# Patient Record
Sex: MALE | Race: BLACK OR AFRICAN AMERICAN | NOT HISPANIC OR LATINO | Employment: OTHER | URBAN - METROPOLITAN AREA
[De-identification: names, ages, dates, MRNs, and addresses within clinical notes are randomized per-mention and may not be internally consistent; named-entity substitution may affect disease eponyms.]

---

## 2019-03-25 ENCOUNTER — OFFICE VISIT (OUTPATIENT)
Dept: URGENT CARE | Facility: CLINIC | Age: 64
End: 2019-03-25
Payer: COMMERCIAL

## 2019-03-25 VITALS
DIASTOLIC BLOOD PRESSURE: 69 MMHG | TEMPERATURE: 99 F | BODY MASS INDEX: 29.29 KG/M2 | SYSTOLIC BLOOD PRESSURE: 133 MMHG | WEIGHT: 176 LBS | HEART RATE: 65 BPM | OXYGEN SATURATION: 97 %

## 2019-03-25 DIAGNOSIS — E86.0 MILD DEHYDRATION: ICD-10-CM

## 2019-03-25 DIAGNOSIS — M62.838 MUSCLE SPASM: ICD-10-CM

## 2019-03-25 DIAGNOSIS — B34.9 VIRAL SYNDROME: Primary | ICD-10-CM

## 2019-03-25 PROCEDURE — 3008F BODY MASS INDEX DOCD: CPT | Mod: CPTII,S$GLB,, | Performed by: PHYSICIAN ASSISTANT

## 2019-03-25 PROCEDURE — 99214 PR OFFICE/OUTPT VISIT, EST, LEVL IV, 30-39 MIN: ICD-10-PCS | Mod: S$GLB,,, | Performed by: PHYSICIAN ASSISTANT

## 2019-03-25 PROCEDURE — 99214 OFFICE O/P EST MOD 30 MIN: CPT | Mod: S$GLB,,, | Performed by: PHYSICIAN ASSISTANT

## 2019-03-25 PROCEDURE — 3008F PR BODY MASS INDEX (BMI) DOCUMENTED: ICD-10-PCS | Mod: CPTII,S$GLB,, | Performed by: PHYSICIAN ASSISTANT

## 2019-03-25 RX ORDER — ORPHENADRINE CITRATE 100 MG/1
100 TABLET, EXTENDED RELEASE ORAL 2 TIMES DAILY
Qty: 30 TABLET | Refills: 0 | Status: SHIPPED | OUTPATIENT
Start: 2019-03-25 | End: 2019-04-09

## 2019-03-25 RX ORDER — ONDANSETRON 8 MG/1
8 TABLET, ORALLY DISINTEGRATING ORAL EVERY 6 HOURS PRN
Qty: 20 TABLET | Refills: 0 | Status: SHIPPED | OUTPATIENT
Start: 2019-03-25 | End: 2019-03-30

## 2019-03-25 NOTE — PATIENT INSTRUCTIONS
· Follow up with your primary care in 2-5 days if symptoms have not improved, or you may return here.  · If you were referred to a specialist, please follow up with that specialty.  · If you were prescribed antibiotics, please take them to completion.  · If you were prescribed a narcotic or any medication with sedative effects, do not drive or operate heavy equipment or machinery while taking these medications.  · You must understand that you have received treatment at an Urgent Care facility only, and that you may be released before all of your medical problems are known or treated. Urgent Care facilities are not equipped to handle life threatening emergencies. It is recommended that you go to an Emergency Department for further evaluation of worsening or concerning symptoms, or possibly life threatening conditions as discussed.                                        If you  smoke, please stop smoking            Dehydration (Adult)  Dehydration occurs when your body loses too much fluid. This may be the result of prolonged vomiting or diarrhea, excessive sweating, or a high fever. It may also happen if you dont drink enough fluid when youre sick or out in the heat. Misuse of diuretics (water pills) can also be a cause.  Symptoms include thirst and decreased urine output. You may also feel dizzy, weak, fatigued, or very drowsy. The diet described below is usually enough to treat dehydration. In some cases, you may need medicine.  Home care  · Drink at least 12 8-ounce glasses of fluid every day to resolve the dehydration. Fluid may include water; orange juice; lemonade; apple, grape, or cranberry juice; clear fruit drinks; electrolyte replacement and sports drinks; and teas and coffee without caffeine. If you have been diagnosed with a kidney disease, ask your doctor how much and what types of fluids you should drink to prevent dehydration. If you have kidney disease, fluid can build up in the body. This can be  dangerous to your health.  · If you have a fever, muscle aches, or a headache as a result of a cold or flu, you may take acetaminophen or ibuprofen, unless another medicine was prescribed. If you have chronic liver or kidney disease, or have ever had a stomach ulcer or gastrointestinal bleeding, talk with your health care provider before using these medicines. Don't take aspirin if you are younger than 18 and have a fever. Aspirin raises the chance for severe liver injury.  Follow-up care  Follow up with your health care provider, or as advised.  When to seek medical advice  Call your health care provider right away if any of these occur:  · Continued vomiting  · Frequent diarrhea (more than 5 times a day); blood (red or black color) or mucus in diarrhea  · Blood in vomit or stool  · Swollen abdomen or increasing abdominal pain  · Weakness, dizziness, or fainting  · Unusual drowsiness or confusion  · Reduced urine output or extreme thirst  · Fever of 100.4°F (34°C) or higher  Date Last Reviewed: 5/31/2015  © 8087-5994 [employer]. [address] [place] [address], [place] [address]. All rights reserved. This information is not intended as a substitute for professional medical care. Always follow your healthcare professional's instructions.

## 2019-03-25 NOTE — PROGRESS NOTES
Subjective:       Patient ID: Ritesh Leonard is a 63 y.o. male.    Vitals:  weight is 79.8 kg (176 lb). His oral temperature is 98.6 °F (37 °C). His blood pressure is 133/69 and his pulse is 65. His oxygen saturation is 97%.     Chief Complaint: Leg Pain    Pt describes leg cramping, primarily R, at rest with nausea, fatigue, and chills. He reports decreased appetite and fluid intake in the previous 48 hours.    Leg Pain    The incident occurred 12 to 24 hours ago. The incident occurred at home. There was no injury mechanism. The pain is present in the right thigh. The quality of the pain is described as cramping. The pain is at a severity of 8/10. The pain is severe. The pain has been intermittent since onset. Pertinent negatives include no inability to bear weight, loss of sensation, numbness or tingling. He reports no foreign bodies present. 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 Treatments tried: potassium pills. The treatment provided no relief.       Constitution: Positive for chills and fatigue. Negative for fever.   HENT: Negative for congestion and sore throat.    Neck: Negative for painful lymph nodes.   Cardiovascular: Negative for chest pain, leg swelling, palpitations, sob on exertion and passing out.   Eyes: Negative for double vision and blurred vision.   Respiratory: Negative for cough and shortness of breath.    Gastrointestinal: Positive for nausea and vomiting. Negative for diarrhea.   Genitourinary: Negative for dysuria, frequency and urgency.   Musculoskeletal: Positive for pain. Negative for trauma, joint pain, joint swelling, back pain, pain with walking, muscle cramps and muscle ache.   Skin: Negative for color change, pale and rash.   Allergic/Immunologic: Negative for seasonal allergies.   Neurological: Negative for dizziness, history of vertigo, light-headedness, passing out, coordination disturbances, loss of balance, headaches and  numbness.   Hematologic/Lymphatic: Negative for swollen lymph nodes, easy bruising/bleeding and history of blood clots. Does not bruise/bleed easily.   Psychiatric/Behavioral: Negative for nervous/anxious, sleep disturbance and depression. The patient is not nervous/anxious.        Objective:      Physical Exam   Constitutional: He is oriented to person, place, and time. Vital signs are normal. He appears well-developed and well-nourished. He is cooperative.  Non-toxic appearance. He does not appear ill. No distress.   HENT:   Head: Normocephalic and atraumatic.   Right Ear: Hearing, tympanic membrane, external ear and ear canal normal.   Left Ear: Hearing, tympanic membrane, external ear and ear canal normal.   Nose: Nose normal. No mucosal edema, rhinorrhea or nasal deformity. No epistaxis. Right sinus exhibits no maxillary sinus tenderness and no frontal sinus tenderness. Left sinus exhibits no maxillary sinus tenderness and no frontal sinus tenderness.   Mouth/Throat: Uvula is midline, oropharynx is clear and moist and mucous membranes are normal. No trismus in the jaw. Normal dentition. No uvula swelling. No posterior oropharyngeal erythema.   Eyes: Conjunctivae and lids are normal. Right eye exhibits no discharge. Left eye exhibits no discharge. No scleral icterus.   Sclera clear bilat   Neck: Trachea normal, normal range of motion, full passive range of motion without pain and phonation normal. Neck supple.   Cardiovascular: Normal rate, regular rhythm, normal heart sounds, intact distal pulses and normal pulses.   Pulmonary/Chest: Effort normal and breath sounds normal. No respiratory distress.   Abdominal: Soft. Normal appearance and bowel sounds are normal. He exhibits no distension, no abdominal bruit, no pulsatile midline mass and no mass. There is no tenderness. There is no rigidity and no guarding.   Musculoskeletal: Normal range of motion. He exhibits no edema or deformity.   Neurological: He is alert  and oriented to person, place, and time. He has normal strength. He exhibits normal muscle tone. Coordination normal.   Skin: Skin is warm, dry and intact. Capillary refill takes less than 2 seconds. He is not diaphoretic. No pallor.   No skin tenting   Psychiatric: He has a normal mood and affect. His speech is normal and behavior is normal. Judgment and thought content normal. Cognition and memory are normal.   Nursing note and vitals reviewed.      Assessment:       1. Viral syndrome    2. Muscle spasm    3. Mild dehydration        Plan:         Viral syndrome  -     ondansetron (ZOFRAN-ODT) 8 MG TbDL; Take 1 tablet (8 mg total) by mouth every 6 (six) hours as needed.  Dispense: 20 tablet; Refill: 0    Muscle spasm  -     orphenadrine (NORFLEX) 100 mg tablet; Take 1 tablet (100 mg total) by mouth 2 (two) times daily. for 15 days  Dispense: 30 tablet; Refill: 0    Mild dehydration      Patient Instructions   · Follow up with your primary care in 2-5 days if symptoms have not improved, or you may return here.  · If you were referred to a specialist, please follow up with that specialty.  · If you were prescribed antibiotics, please take them to completion.  · If you were prescribed a narcotic or any medication with sedative effects, do not drive or operate heavy equipment or machinery while taking these medications.  · You must understand that you have received treatment at an Urgent Care facility only, and that you may be released before all of your medical problems are known or treated. Urgent Care facilities are not equipped to handle life threatening emergencies. It is recommended that you go to an Emergency Department for further evaluation of worsening or concerning symptoms, or possibly life threatening conditions as discussed.                                        If you  smoke, please stop smoking            Dehydration (Adult)  Dehydration occurs when your body loses too much fluid. This may be the  result of prolonged vomiting or diarrhea, excessive sweating, or a high fever. It may also happen if you dont drink enough fluid when youre sick or out in the heat. Misuse of diuretics (water pills) can also be a cause.  Symptoms include thirst and decreased urine output. You may also feel dizzy, weak, fatigued, or very drowsy. The diet described below is usually enough to treat dehydration. In some cases, you may need medicine.  Home care  · Drink at least 12 8-ounce glasses of fluid every day to resolve the dehydration. Fluid may include water; orange juice; lemonade; apple, grape, or cranberry juice; clear fruit drinks; electrolyte replacement and sports drinks; and teas and coffee without caffeine. If you have been diagnosed with a kidney disease, ask your doctor how much and what types of fluids you should drink to prevent dehydration. If you have kidney disease, fluid can build up in the body. This can be dangerous to your health.  · If you have a fever, muscle aches, or a headache as a result of a cold or flu, you may take acetaminophen or ibuprofen, unless another medicine was prescribed. If you have chronic liver or kidney disease, or have ever had a stomach ulcer or gastrointestinal bleeding, talk with your health care provider before using these medicines. Don't take aspirin if you are younger than 18 and have a fever. Aspirin raises the chance for severe liver injury.  Follow-up care  Follow up with your health care provider, or as advised.  When to seek medical advice  Call your health care provider right away if any of these occur:  · Continued vomiting  · Frequent diarrhea (more than 5 times a day); blood (red or black color) or mucus in diarrhea  · Blood in vomit or stool  · Swollen abdomen or increasing abdominal pain  · Weakness, dizziness, or fainting  · Unusual drowsiness or confusion  · Reduced urine output or extreme thirst  · Fever of 100.4°F (34°C) or higher  Date Last Reviewed:  5/31/2015  © 4612-8465 [employer]. [address] [place] [address], [place] [address]. All rights reserved. This information is not intended as a substitute for professional medical care. Always follow your healthcare professional's instructions.

## 2019-03-25 NOTE — LETTER
March 25, 2019      [facility] [place]  [address], [address]  [place] [address]  Phone: [phone]  Fax: [phone]       Patient: Ritesh Leonard   YOB: 1955  Date of Visit: 03/25/2019    To Whom It May Concern:    Neo Leonard  was at [facility] on 03/25/2019. He may return to work/school on 3/27/2019 with no restrictions. If you have any questions or concerns, or if I can be of further assistance, please do not hesitate to contact me.    Sincerely,    Min Melgar PA-C

## 2020-02-14 ENCOUNTER — OFFICE VISIT (OUTPATIENT)
Dept: URGENT CARE | Facility: CLINIC | Age: 65
End: 2020-02-14
Payer: OTHER MISCELLANEOUS

## 2020-02-14 VITALS
HEART RATE: 85 BPM | WEIGHT: 176 LBS | BODY MASS INDEX: 29.32 KG/M2 | OXYGEN SATURATION: 97 % | DIASTOLIC BLOOD PRESSURE: 73 MMHG | HEIGHT: 65 IN | RESPIRATION RATE: 20 BRPM | SYSTOLIC BLOOD PRESSURE: 136 MMHG

## 2020-02-14 DIAGNOSIS — S39.92XA INJURY OF LOW BACK, INITIAL ENCOUNTER: ICD-10-CM

## 2020-02-14 DIAGNOSIS — Z02.83 ENCOUNTER FOR DRUG SCREENING: ICD-10-CM

## 2020-02-14 DIAGNOSIS — S32.010A COMPRESSION FRACTURE OF L1 VERTEBRA, INITIAL ENCOUNTER: Primary | ICD-10-CM

## 2020-02-14 PROCEDURE — 99203 OFFICE O/P NEW LOW 30 MIN: CPT | Mod: 25,S$GLB,, | Performed by: PHYSICIAN ASSISTANT

## 2020-02-14 PROCEDURE — 96372 PR INJECTION,THERAP/PROPH/DIAG2ST, IM OR SUBCUT: ICD-10-PCS | Mod: S$GLB,,, | Performed by: PHYSICIAN ASSISTANT

## 2020-02-14 PROCEDURE — 72100 X-RAY EXAM L-S SPINE 2/3 VWS: CPT | Mod: S$GLB,,, | Performed by: RADIOLOGY

## 2020-02-14 PROCEDURE — 96372 THER/PROPH/DIAG INJ SC/IM: CPT | Mod: S$GLB,,, | Performed by: PHYSICIAN ASSISTANT

## 2020-02-14 PROCEDURE — 72100 XR LUMBAR SPINE 2 OR 3 VIEWS: ICD-10-PCS | Mod: S$GLB,,, | Performed by: RADIOLOGY

## 2020-02-14 PROCEDURE — 99203 PR OFFICE/OUTPT VISIT, NEW, LEVL III, 30-44 MIN: ICD-10-PCS | Mod: 25,S$GLB,, | Performed by: PHYSICIAN ASSISTANT

## 2020-02-14 RX ORDER — CYCLOBENZAPRINE HCL 10 MG
10 TABLET ORAL 3 TIMES DAILY PRN
Qty: 30 TABLET | Refills: 0 | Status: SHIPPED | OUTPATIENT
Start: 2020-02-14 | End: 2020-02-24

## 2020-02-14 RX ORDER — HYDROCODONE BITARTRATE AND ACETAMINOPHEN 5; 325 MG/1; MG/1
1 TABLET ORAL EVERY 6 HOURS PRN
Qty: 16 TABLET | Refills: 0 | Status: SHIPPED | OUTPATIENT
Start: 2020-02-14 | End: 2020-02-18

## 2020-02-14 RX ORDER — KETOROLAC TROMETHAMINE 30 MG/ML
30 INJECTION, SOLUTION INTRAMUSCULAR; INTRAVENOUS
Status: COMPLETED | OUTPATIENT
Start: 2020-02-14 | End: 2020-02-14

## 2020-02-14 RX ADMIN — KETOROLAC TROMETHAMINE 30 MG: 30 INJECTION, SOLUTION INTRAMUSCULAR; INTRAVENOUS at 05:02

## 2020-02-14 NOTE — PROGRESS NOTES
"Subjective:       Patient ID: Ritesh Leonard is a 64 y.o. male.    Vitals:  height is 5' 5" (1.651 m) and weight is 79.8 kg (176 lb). His blood pressure is 136/73 and his pulse is 85. His respiration is 20 and oxygen saturation is 97%.     Chief Complaint: Back Injury    Back Pain   This is a new problem. The current episode started yesterday. The problem occurs constantly. The problem has been gradually worsening since onset. Quality: sharp pain. Radiates to: hip area. The pain is at a severity of 8/10. The pain is severe. The pain is the same all the time. The symptoms are aggravated by standing, lying down, position and bending. Stiffness is present at night and in the morning. Pertinent negatives include no abdominal pain, bladder incontinence, bowel incontinence, chest pain, dysuria, fever, headaches, leg pain, numbness, paresis, paresthesias, pelvic pain, perianal numbness, tingling, weakness or weight loss. He has tried heat (tylenol, ) for the symptoms. The treatment provided mild relief.       Constitution: Negative for activity change, appetite change, chills, sweating, fatigue, fever, unexpected weight change and generalized weakness.   Cardiovascular: Negative for chest pain.   Gastrointestinal: Negative for abdominal pain and bowel incontinence.   Genitourinary: Negative for dysuria, urgency, bladder incontinence, hematuria and pelvic pain.   Musculoskeletal: Positive for pain, trauma, back pain and pain with walking. Negative for muscle cramps and history of spine disorder.   Skin: Negative for rash.   Neurological: Negative for dizziness, history of vertigo, light-headedness, passing out, facial drooping, speech difficulty, coordination disturbances, loss of balance, headaches, history of migraines, disorientation, altered mental status, loss of consciousness, numbness, tingling, seizures and tremors.   Psychiatric/Behavioral: Negative for altered mental status and disorientation.       Objective: "      Physical Exam   Constitutional: He is oriented to person, place, and time. Vital signs are normal. He appears well-developed and well-nourished. He is active and cooperative.  Non-toxic appearance. He does not appear ill. No distress.   HENT:   Head: Normocephalic and atraumatic. Head is without abrasion, without contusion and without laceration.   Right Ear: Hearing, tympanic membrane, external ear and ear canal normal. No hemotympanum.   Left Ear: Hearing, tympanic membrane, external ear and ear canal normal. No hemotympanum.   Nose: Nose normal. No mucosal edema, rhinorrhea or nasal deformity. No epistaxis. Right sinus exhibits no maxillary sinus tenderness and no frontal sinus tenderness. Left sinus exhibits no maxillary sinus tenderness and no frontal sinus tenderness.   Mouth/Throat: Uvula is midline, oropharynx is clear and moist and mucous membranes are normal. No trismus in the jaw. Normal dentition. No uvula swelling. No posterior oropharyngeal erythema.   Eyes: Pupils are equal, round, and reactive to light. Conjunctivae, EOM and lids are normal. Right eye exhibits no discharge. Left eye exhibits no discharge. No scleral icterus.   Neck: Trachea normal, normal range of motion, full passive range of motion without pain and phonation normal. Neck supple. No spinous process tenderness and no muscular tenderness present. No neck rigidity. No tracheal deviation present.   Cardiovascular: Normal rate, regular rhythm, normal heart sounds, intact distal pulses and normal pulses.   Pulmonary/Chest: Effort normal and breath sounds normal. No respiratory distress.   Abdominal: Soft. Normal appearance and bowel sounds are normal. He exhibits no distension, no abdominal bruit, no pulsatile midline mass and no mass. There is no tenderness.   Musculoskeletal: Normal range of motion. He exhibits no edema or deformity.        Lumbar back: He exhibits pain. He exhibits no tenderness, no bony tenderness, no swelling,  no edema, no deformity, no laceration and normal pulse.        Back:    Neurological: He is alert and oriented to person, place, and time. He has normal strength and normal reflexes. No cranial nerve deficit or sensory deficit. He exhibits normal muscle tone. He displays no seizure activity. Coordination normal. GCS eye subscore is 4. GCS verbal subscore is 5. GCS motor subscore is 6.   Skin: Skin is warm, dry, intact, not diaphoretic and not pale. Capillary refill takes less than 2 seconds. abrasion, burn, bruising and ecchymosis  Psychiatric: He has a normal mood and affect. His speech is normal and behavior is normal. Judgment and thought content normal. Cognition and memory are normal.   Nursing note and vitals reviewed.    X-ray Lumbar Spine 2 Or 3 Views    Result Date: 2/14/2020  EXAMINATION: XR LUMBAR SPINE 2 OR 3 VIEWS CLINICAL HISTORY: Low back pain, minor trauma;  Unspecified injury of lower back, initial encounter TECHNIQUE: Two views COMPARISON: 10/18/2014 FINDINGS: Mild height loss/compression fracture at L1.  Alignment within normal limits.  Osteopenia noted.     Mild L1 compression fracture, age indeterminate.  Further evaluation could be performed with MRI, if indicated. Electronically signed by: Heraclio Ayala MD Date:    02/14/2020 Time:    17:21          Assessment:       1. Compression fracture of L1 vertebra, initial encounter    2. Injury of low back, initial encounter    3. Encounter for drug screening        Plan:       All hx was provided by the pt or available as part of established EMR. The pt past medical hx, family hx, social hx, and current medications were reviewed. Interpretation of diagnostics performed today were discussed. Pt to follow up with pcp or return to urgent care if no improvement or for any concern, and seek treatment in an ER for worsening. Tx options discussed. Pt voiced understanding of all discussed and agreed with decision making.    Compression fracture of L1  vertebra, initial encounter  -     Ambulatory referral/consult to Occupational Medicine  -     cyclobenzaprine (FLEXERIL) 10 MG tablet; Take 1 tablet (10 mg total) by mouth 3 (three) times daily as needed for Muscle spasms.  Dispense: 30 tablet; Refill: 0  -     HYDROcodone-acetaminophen (NORCO) 5-325 mg per tablet; Take 1 tablet by mouth every 6 (six) hours as needed for Pain.  Dispense: 16 tablet; Refill: 0  -     ketorolac injection 30 mg    Injury of low back, initial encounter  -     X-Ray Lumbar Spine 2 Or 3 Views; Future; Expected date: 02/14/2020    Encounter for drug screening      Patient Instructions   · Follow up with your primary care if symptoms do not improve, or you may return here at any time.  · If you were referred to a specialist, please follow up with that specialty.  · If you were prescribed antibiotics, please take them to completion.  · If you were prescribed a narcotic or any medication with sedative effects, do not drive or operate heavy equipment or machinery while taking these medications.  · You must understand that you have received treatment at an Urgent Care facility only, and that you may be released before all of your medical problems are known or treated. Urgent Care facilities are not equipped to handle life threatening emergencies. It is recommended that you seek care at an Emergency Department for further evaluation of worsening or concerning symptoms, or possibly life threatening conditions as discussed.                                        If you  smoke, please stop smoking              Vertebral Compression Fracture    You have a compression fracture or break in one of the bones in your spine. This kind of break usually happens in older people with thinning of the bones called osteoporosis. It may happen after a ground level fall or even with a very minor force. This can include bending forward, getting up from a seated position, coughing, or sneezing.  It may also occur in  young healthy people after a severe trauma, such as a car accident or fall from a height. This is generally a stable break and usually does not cause any injury to the spinal cord or nerves. This injury usually takes 1 to 3 months to heal. It can be treated at home with bed rest and pain medicine.  Prescription or over-the-counter pain medicine can be used to control the pain. Long-term use of pain medicine can increase the risk of side effects. This includes: liver or kidney damage, GI bleeding, constipation, or narcotic dependence. If you have chronic liver or kidney disease, or ever had a stomach ulcer or GI bleeding, talk with your healthcare provider before using these medicines. If pain medicine is needed for more than 1 to 2 weeks, talk with your healthcare provider about other treatment options.  A back brace or abdominal binder may be prescribed to reduce pain by limiting motion at the site of the break. If you have osteoporosis, talk with your healthcare provider about using calcium and vitamin D supplements. You may need prescription medicines to prevent further bone loss. An exercise program to strengthen spine strength is a very important part of the treatment plan. It should begin once the pain is under control.  If you have severe or persistent pain, your healthcare provider may recommend a procedure called a vertebral augmentation. In this procedure, a needle is used to inject a bone cement into the broken vertebra. This will expand it  to its original shape.  Home care  · You may need to stay in bed for the first few days. But, begin sitting or walking as soon as possible. This will help you avoid problems with prolonged bed rest such as: muscle weakness, worsening back stiffness and pain, and blood clots in the legs.  · When in bed, try to find a comfortable position. A firm mattress is best. Try lying flat on your back with pillows under your knees. You can also try lying on your side  with your knees bent up towards your chest and a pillow between your knees.  · Avoid sitting for long periods of time. This puts more stress on the lower back than standing or walking.  · Apply an ice pack over the injured area for 15 to 20 minutes every 3 to 6 hours. You should do this for the first 24 to 48 hours. You can make an ice pack by filling a plastic bag that seals at the top with ice cubes and then wrapping it with a thin towel. You can start with ice, then switch to heat after two days. Apply heat (warm shower or warm bath) for 15 to 20 minutes several times a day for muscle spasms. Some patients feel best alternating ice and heat treatments. Use the one method that feels the best to you. Be careful not to injure your skin with the ice or heat treatments. Ice should never be applied directly to skin. Warm rather than hot heat should be used to protect skin areas that have decreased sensation.  · Take pain medicine as directed. Call your healthcare provider if your pain is not well-controlled. A dose change, stronger medicine, or other treatment options may be needed.  · Be aware of safe lifting methods and do not lift anything over 10 pounds until all the pain is gone.  Follow-up care  Follow up with your healthcare provider, or as advised. If X-rays were taken, you will be told of any new findings that may affect your care.  Call 911  Call 911 if you have:  · Weakness or numbness in one or both legs  · Loss of control over bowels or bladder  · Numbness in the groin area  When to seek medical advice  Call your healthcare provider right away if the pain gets worse or spreads to your arms or legs.  Date Last Reviewed: 11/23/2015  © 4811-4553 [employer]. [address] [place] [address], [place] [address]. All rights reserved. This information is not intended as a substitute for professional medical care. Always follow your healthcare professional's instructions.

## 2020-02-14 NOTE — PATIENT INSTRUCTIONS
· Follow up with your primary care if symptoms do not improve, or you may return here at any time.  · If you were referred to a specialist, please follow up with that specialty.  · If you were prescribed antibiotics, please take them to completion.  · If you were prescribed a narcotic or any medication with sedative effects, do not drive or operate heavy equipment or machinery while taking these medications.  · You must understand that you have received treatment at an Urgent Care facility only, and that you may be released before all of your medical problems are known or treated. Urgent Care facilities are not equipped to handle life threatening emergencies. It is recommended that you seek care at an Emergency Department for further evaluation of worsening or concerning symptoms, or possibly life threatening conditions as discussed.                                        If you  smoke, please stop smoking              Vertebral Compression Fracture    You have a compression fracture or break in one of the bones in your spine. This kind of break usually happens in older people with thinning of the bones called osteoporosis. It may happen after a ground level fall or even with a very minor force. This can include bending forward, getting up from a seated position, coughing, or sneezing.  It may also occur in young healthy people after a severe trauma, such as a car accident or fall from a height. This is generally a stable break and usually does not cause any injury to the spinal cord or nerves. This injury usually takes 1 to 3 months to heal. It can be treated at home with bed rest and pain medicine.  Prescription or over-the-counter pain medicine can be used to control the pain. Long-term use of pain medicine can increase the risk of side effects. This includes: liver or kidney damage, GI bleeding, constipation, or narcotic dependence. If you have chronic liver or kidney disease, or ever had a stomach ulcer or GI  bleeding, talk with your healthcare provider before using these medicines. If pain medicine is needed for more than 1 to 2 weeks, talk with your healthcare provider about other treatment options.  A back brace or abdominal binder may be prescribed to reduce pain by limiting motion at the site of the break. If you have osteoporosis, talk with your healthcare provider about using calcium and vitamin D supplements. You may need prescription medicines to prevent further bone loss. An exercise program to strengthen spine strength is a very important part of the treatment plan. It should begin once the pain is under control.  If you have severe or persistent pain, your healthcare provider may recommend a procedure called a vertebral augmentation. In this procedure, a needle is used to inject a bone cement into the broken vertebra. This will expand it  to its original shape.  Home care  · You may need to stay in bed for the first few days. But, begin sitting or walking as soon as possible. This will help you avoid problems with prolonged bed rest such as: muscle weakness, worsening back stiffness and pain, and blood clots in the legs.  · When in bed, try to find a comfortable position. A firm mattress is best. Try lying flat on your back with pillows under your knees. You can also try lying on your side with your knees bent up towards your chest and a pillow between your knees.  · Avoid sitting for long periods of time. This puts more stress on the lower back than standing or walking.  · Apply an ice pack over the injured area for 15 to 20 minutes every 3 to 6 hours. You should do this for the first 24 to 48 hours. You can make an ice pack by filling a plastic bag that seals at the top with ice cubes and then wrapping it with a thin towel. You can start with ice, then switch to heat after two days. Apply heat (warm shower or warm bath) for 15 to 20 minutes several times a day for muscle spasms. Some patients  feel best alternating ice and heat treatments. Use the one method that feels the best to you. Be careful not to injure your skin with the ice or heat treatments. Ice should never be applied directly to skin. Warm rather than hot heat should be used to protect skin areas that have decreased sensation.  · Take pain medicine as directed. Call your healthcare provider if your pain is not well-controlled. A dose change, stronger medicine, or other treatment options may be needed.  · Be aware of safe lifting methods and do not lift anything over 10 pounds until all the pain is gone.  Follow-up care  Follow up with your healthcare provider, or as advised. If X-rays were taken, you will be told of any new findings that may affect your care.  Call 911  Call 911 if you have:  · Weakness or numbness in one or both legs  · Loss of control over bowels or bladder  · Numbness in the groin area  When to seek medical advice  Call your healthcare provider right away if the pain gets worse or spreads to your arms or legs.  Date Last Reviewed: 11/23/2015 © 2000-2017 [employer]. [address] [place] [address], [place] [address]. All rights reserved. This information is not intended as a substitute for professional medical care. Always follow your healthcare professional's instructions.

## 2020-02-16 ENCOUNTER — TELEPHONE (OUTPATIENT)
Dept: URGENT CARE | Facility: CLINIC | Age: 65
End: 2020-02-16

## 2020-02-16 NOTE — TELEPHONE ENCOUNTER
Called to check up on pt since visit. Pt reports he is dealing with significant pain. He denies saddle anesthesia, lower limb weakness, and urinary/fecal incontinence. He was reminded that he has narcotic pain medication rx, and muscle relaxants  Sent to pharmacy. He agreed to follow up with occupational medicine at his earliest convenience. He voiced understanding of all discussed and had no further questions.

## 2022-07-12 ENCOUNTER — TELEPHONE (OUTPATIENT)
Dept: URGENT CARE | Facility: CLINIC | Age: 67
End: 2022-07-12

## 2022-07-12 ENCOUNTER — OFFICE VISIT (OUTPATIENT)
Dept: URGENT CARE | Facility: CLINIC | Age: 67
End: 2022-07-12
Payer: OTHER MISCELLANEOUS

## 2022-07-12 VITALS
WEIGHT: 147 LBS | TEMPERATURE: 97 F | OXYGEN SATURATION: 96 % | BODY MASS INDEX: 23.63 KG/M2 | RESPIRATION RATE: 18 BRPM | SYSTOLIC BLOOD PRESSURE: 153 MMHG | HEART RATE: 67 BPM | HEIGHT: 66 IN | DIASTOLIC BLOOD PRESSURE: 75 MMHG

## 2022-07-12 VITALS — WEIGHT: 176 LBS | HEIGHT: 65 IN | BODY MASS INDEX: 29.32 KG/M2

## 2022-07-12 DIAGNOSIS — Z02.6 ENCOUNTER RELATED TO WORKER'S COMPENSATION CLAIM: ICD-10-CM

## 2022-07-12 DIAGNOSIS — S22.32XA CLOSED FRACTURE OF ONE RIB OF LEFT SIDE, INITIAL ENCOUNTER: Primary | ICD-10-CM

## 2022-07-12 DIAGNOSIS — S20.212A CONTUSION OF LEFT CHEST WALL, INITIAL ENCOUNTER: ICD-10-CM

## 2022-07-12 DIAGNOSIS — S29.9XXA CHEST WALL INJURY, INITIAL ENCOUNTER: ICD-10-CM

## 2022-07-12 PROCEDURE — 71101 X-RAY EXAM UNILAT RIBS/CHEST: CPT | Mod: LT,S$GLB,, | Performed by: RADIOLOGY

## 2022-07-12 PROCEDURE — 99203 OFFICE O/P NEW LOW 30 MIN: CPT | Mod: S$GLB,,, | Performed by: PHYSICIAN ASSISTANT

## 2022-07-12 PROCEDURE — 99203 PR OFFICE/OUTPT VISIT, NEW, LEVL III, 30-44 MIN: ICD-10-PCS | Mod: S$GLB,,, | Performed by: PHYSICIAN ASSISTANT

## 2022-07-12 PROCEDURE — 71101 XR RIBS MIN 3 VIEWS W/ PA CHEST LEFT: ICD-10-PCS | Mod: LT,S$GLB,, | Performed by: RADIOLOGY

## 2022-07-12 RX ORDER — NAPROXEN 500 MG/1
500 TABLET ORAL 2 TIMES DAILY WITH MEALS
Qty: 20 TABLET | Refills: 0 | Status: ON HOLD | OUTPATIENT
Start: 2022-07-12 | End: 2022-10-09 | Stop reason: HOSPADM

## 2022-07-12 NOTE — PROGRESS NOTES
Subjective:       Patient ID: Ritesh Leonard is a 66 y.o. male.    Chief Complaint: Rib Injury    Pt is here today presenting with left rib pain and bruises. He states that he got the bruises two months ago. 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 He went to lean out of his window to tell someone thank you that was helping direct him when he struck his side on the manual window hand crank.     Other  This is a new problem. Episode onset: 2 months ago  The problem occurs intermittently. The problem has been waxing and waning. The symptoms are aggravated by bending (Squatting ). He has tried heat for the symptoms. The treatment provided mild relief.       Respiratory: Negative for shortness of breath.    Musculoskeletal: Positive for pain.        Left sided rib pain        Objective:      Physical Exam  Vitals and nursing note reviewed.   Constitutional:       General: He is not in acute distress.     Appearance: Normal appearance. He is not ill-appearing, toxic-appearing or diaphoretic.   HENT:      Head: Normocephalic and atraumatic.      Right Ear: Ear canal and external ear normal.      Left Ear: Ear canal and external ear normal.      Nose: Nose normal.      Mouth/Throat:      Mouth: Mucous membranes are moist.      Pharynx: Oropharynx is clear.   Eyes:      Extraocular Movements: Extraocular movements intact.      Conjunctiva/sclera: Conjunctivae normal.      Pupils: Pupils are equal, round, and reactive to light.   Cardiovascular:      Rate and Rhythm: Normal rate and regular rhythm.      Pulses: Normal pulses.      Heart sounds: Normal heart sounds. No murmur heard.    No friction rub. No gallop.   Pulmonary:      Effort: Pulmonary effort is normal. No accessory muscle usage or respiratory distress.      Breath sounds: Normal breath sounds. No stridor or decreased air movement. No decreased breath sounds, wheezing, rhonchi or rales.   Chest:      Chest wall: Tenderness present. No mass,  deformity, swelling, crepitus or edema.   Breasts: Breasts are symmetrical.         Musculoskeletal:      Cervical back: Normal range of motion. No tenderness.   Skin:     Findings: No rash.   Neurological:      General: No focal deficit present.      Mental Status: He is alert and oriented to person, place, and time.      Cranial Nerves: No cranial nerve deficit.   Psychiatric:         Mood and Affect: Mood normal.         Behavior: Behavior normal.         Thought Content: Thought content normal.         Judgment: Judgment normal.         Assessment:       1. Closed fracture of one rib of left side, initial encounter    2. Encounter related to worker's compensation claim    3. Chest wall injury, initial encounter    4. Contusion of left chest wall, initial encounter        Plan:            Medications Ordered This Encounter   Medications    naproxen (NAPROSYN) 500 MG tablet     Sig: Take 1 tablet (500 mg total) by mouth 2 (two) times daily with meals.     Dispense:  20 tablet     Refill:  0     XR RIB LEFT W/ PA CHEST    Result Date: 7/12/2022  EXAMINATION: XR RIBS MIN 3 VIEWS W/ PA CHEST LEFT CLINICAL HISTORY: Unspecified injury of thorax, initial encounter TECHNIQUE: PA chest and left ribs three views. COMPARISON: None FINDINGS: Cardiac silhouette is normal in size.  Lungs are symmetrically expanded.  No evidence of focal consolidative process, pneumothorax, or significant pleural effusion.  Acute mild displaced fracture is seen of the left lateral 7th rib.  No additional acute displaced rib fractures are identified.     No acute cardiopulmonary process identified. Acute left lateral 7th rib fracture. Electronically signed by: Anastacia Gramajo MD Date:    07/12/2022 Time:    19:14    Patient Instructions: Attention not to aggravate affected area   Restrictions: Disabled until next office visit  Follow up if symptoms worsen or fail to improve.

## 2022-07-12 NOTE — PROGRESS NOTES
"Subjective:       Patient ID: Ritesh Leonard is a 66 y.o. male.    Vitals:  height is 5' 5" (1.651 m) and weight is 79.8 kg (176 lb).     Chief Complaint: Rib Pain    Pain  Associated symptoms comments: Contusion on left ribs x 2 months. States the pain and bruising never went away.   .     ROS    Objective:      Physical Exam      Assessment:       No diagnosis found.      Plan:         There are no diagnoses linked to this encounter.               "

## 2022-07-12 NOTE — LETTER
[place] [facility]  [address] [facility] [place] [address]  Phone: [phone]  Fax: [phone]  [facility]: [id number]    Pt Name: Ritesh Leonard  Injury Date: 06/30/2022   Employee ID: [id number] Date of First Treatment: 07/12/2022   Company: [employer]      Appointment Time: 05:20 PM Arrived: 5:00 pm   Provider: Madhavi Womack PA-C Time Out: 6:30pm     Office Treatment:   1. Closed fracture of one rib of left side, initial encounter    2. Encounter related to worker's compensation claim    3. Chest wall injury, initial encounter    4. Contusion of left chest wall, initial encounter      Medications Ordered This Encounter   Medications    naproxen (NAPROSYN) 500 MG tablet      Patient Instructions: Attention not to aggravate affected area    Restrictions: Disabled until next office visit     Return Appointment: 7/12/2022 at 10 am

## 2022-07-12 NOTE — LETTER
[place] [facility]gent [facility]  [address] [facility] [place] [address]  Phone: [phone]  Fax: [phone]  [facility]: [id number]    Pt Name: Ritesh Leonard  Injury Date: 06/30/2022   Employee ID: [id number] Date of First Treatment: 07/12/2022   Company: [employer]      Appointment Time: 05:20 PM Arrived: 5:00 PM   Provider: Madhavi Womack PA-C Time Out:6:40PM     Office Treatment:   1. Encounter related to worker's compensation claim    2. Chest wall injury, initial encounter    3. Contusion of left chest wall, initial encounter      Medications Ordered This Encounter   Medications    naproxen (NAPROSYN) 500 MG tablet      Patient Instructions: Attention not to aggravate affected area    Restrictions: Regular Duty     Return Appointment: prn

## 2022-07-13 NOTE — TELEPHONE ENCOUNTER
Called patient and informed of + xray results for fracture rib (left 7th rib). Will have patient return for reevaluation by [facility] physician on Thursday. Note addended   See cardiac Rehab Monitor Notes in Media section.

## 2022-07-14 ENCOUNTER — OFFICE VISIT (OUTPATIENT)
Dept: URGENT CARE | Facility: CLINIC | Age: 67
End: 2022-07-14
Payer: OTHER MISCELLANEOUS

## 2022-07-14 VITALS
RESPIRATION RATE: 16 BRPM | WEIGHT: 147 LBS | HEART RATE: 61 BPM | BODY MASS INDEX: 23.63 KG/M2 | DIASTOLIC BLOOD PRESSURE: 74 MMHG | HEIGHT: 66 IN | SYSTOLIC BLOOD PRESSURE: 129 MMHG | OXYGEN SATURATION: 97 % | TEMPERATURE: 97 F

## 2022-07-14 DIAGNOSIS — S22.32XD CLOSED FRACTURE OF ONE RIB OF LEFT SIDE WITH ROUTINE HEALING, SUBSEQUENT ENCOUNTER: Primary | ICD-10-CM

## 2022-07-14 PROCEDURE — 99214 PR OFFICE/OUTPT VISIT, EST, LEVL IV, 30-39 MIN: ICD-10-PCS | Mod: S$GLB,,, | Performed by: FAMILY MEDICINE

## 2022-07-14 PROCEDURE — 99214 OFFICE O/P EST MOD 30 MIN: CPT | Mod: S$GLB,,, | Performed by: FAMILY MEDICINE

## 2022-07-14 RX ORDER — NAPROXEN 500 MG/1
500 TABLET ORAL 2 TIMES DAILY WITH MEALS
Qty: 20 TABLET | Refills: 0 | Status: ON HOLD | OUTPATIENT
Start: 2022-07-14 | End: 2022-09-24

## 2022-07-14 NOTE — PROGRESS NOTES
Subjective:       Patient ID: Ritesh Leonard is a 66 y.o. male.    Chief Complaint: Follow-up (Pt presents for a f/u W/C visit. )    Patient presents today for a f/u visit  left broken ribs. 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 Incident occurred within 2 months ago.       Constitution: Negative.   HENT: Negative.    Cardiovascular: Negative.    Eyes: Negative.    Respiratory: Negative.    Gastrointestinal: Negative.    Endocrine: negative.   Genitourinary: Negative.    Musculoskeletal: Negative.    Skin: Negative.    Allergic/Immunologic: Negative.    Neurological: Negative.    Hematologic/Lymphatic: Negative.    Psychiatric/Behavioral: Negative.         Objective:      Physical Exam  Vitals and nursing note reviewed.   Constitutional:       General: He is not in acute distress.     Appearance: Normal appearance. He is well-developed. He is not ill-appearing, toxic-appearing or diaphoretic.   HENT:      Head: Normocephalic and atraumatic. No abrasion, contusion or laceration.      Jaw: No trismus.      Right Ear: Hearing, tympanic membrane, ear canal and external ear normal. No hemotympanum.      Left Ear: Hearing, tympanic membrane, ear canal and external ear normal. No hemotympanum.      Nose: Nose normal. No nasal deformity, mucosal edema or rhinorrhea.      Right Sinus: No maxillary sinus tenderness or frontal sinus tenderness.      Left Sinus: No maxillary sinus tenderness or frontal sinus tenderness.      Mouth/Throat:      Dentition: Normal dentition.      Pharynx: Uvula midline. No posterior oropharyngeal erythema or uvula swelling.   Eyes:      General: Lids are normal. No scleral icterus.        Right eye: No discharge.         Left eye: No discharge.      Conjunctiva/sclera: Conjunctivae normal.      Pupils: Pupils are equal, round, and reactive to light.      Comments: Sclera clear bilat   Neck:      Trachea: Trachea and phonation normal. No tracheal  deviation.   Cardiovascular:      Rate and Rhythm: Normal rate and regular rhythm.      Pulses: Normal pulses.      Heart sounds: Normal heart sounds.   Pulmonary:      Effort: Pulmonary effort is normal. No respiratory distress.      Breath sounds: Normal breath sounds.   Chest:      Chest wall: Swelling and tenderness present.       Abdominal:      General: Bowel sounds are normal. There is no distension.      Palpations: Abdomen is soft. There is no mass or pulsatile mass.      Tenderness: There is no abdominal tenderness.   Musculoskeletal:         General: No deformity. Normal range of motion.      Cervical back: Full passive range of motion without pain, normal range of motion and neck supple. No rigidity. No spinous process tenderness or muscular tenderness.   Skin:     General: Skin is warm and dry.      Capillary Refill: Capillary refill takes less than 2 seconds.      Coloration: Skin is not pale.      Findings: No abrasion, bruising, burn, ecchymosis or laceration.   Neurological:      Mental Status: He is alert and oriented to person, place, and time.      GCS: GCS eye subscore is 4. GCS verbal subscore is 5. GCS motor subscore is 6.      Cranial Nerves: No cranial nerve deficit.      Sensory: No sensory deficit.      Motor: No abnormal muscle tone or seizure activity.      Coordination: Coordination normal.   Psychiatric:         Speech: Speech normal.         Behavior: Behavior normal. Behavior is cooperative.         Thought Content: Thought content normal.         Judgment: Judgment normal.         Assessment:       1. Closed fracture of one rib of left side with routine healing, subsequent encounter        Plan:         Medications Ordered This Encounter   Medications    naproxen (NAPROSYN) 500 MG tablet     Sig: Take 1 tablet (500 mg total) by mouth 2 (two) times daily with meals.     Dispense:  20 tablet     Refill:  0     Patient Instructions: Attention not to aggravate affected area, Apply ice  24-48 hours then apply heat/warm soaks   Restrictions: Sit or stand as needed, Avoid frequent bending/lifting/twisting, No lifting/pushing/pulling more than 10 lbs, No above the shoulder/overhead work  Follow up in about 3 days (around 7/17/2022), or if symptoms worsen or fail to improve.      Please drink plenty of fluids.  Please get plenty of rest.  Please return here or go to the Emergency Department for any concerns or worsening of condition.  If you were prescribed a narcotic medication, do not drive or operate heavy equipment or machinery while taking these medications.  If you were not prescribed an anti-inflammatory medication, and if you do not have any history of stomach/intestinal ulcers, or kidney disease, or are not taking a blood thinner such as Coumadin, Plavix, Pradaxa, Eloquis, or Xaralta for example, it is OK to take over the counter Ibuprofen or Advil or Motrin or Aleve as directed.  Do not take these medications on an empty stomach.    Warm compresses and/or heating pad to ribs several times a day for comfort.    You may bind your ribs with a large ace wrap or binder if you find it helpful to relieve pain.  Th is is optional.    If you  smoke, please stop smoking.       Please follow up with your primary care doctor or specialist as needed.      Blaine Lira MD  [phone]    You must understand that you have received treatment at an Urgent Care facility only, and that you may be  released before all of your medical problems are known or treated. Urgent Care facilities are not equipped to  handle life threatening emergencies. It is recommended that you seek care at an Emergency Department for  further evaluation of worsening or concerning symptoms, or possibly life threatening conditions as  discussed.

## 2022-07-14 NOTE — LETTER
[place] - [facility]gent [facility]  [address] [facility] [address]  [place] [address]  Phone: [phone]  Fax: [phone]  [facility]: [id number]    Pt Name: Ritesh Leonard  Injury Date: 06/30/2022   Employee ID: [id number] Date of First Treatment: 07/14/2022   Company: [employer]      Appointment Time: 10:00 AM Arrived: 10:00 AM   Provider: Cosme Davis MD Time Out:11:00 AM     Office Treatment:   1. Closed fracture of one rib of left side with routine healing, subsequent encounter      Medications Ordered This Encounter   Medications    naproxen (NAPROSYN) 500 MG tablet      Patient Instructions: Attention not to aggravate affected area, Apply ice 24-48 hours then apply heat/warm soaks    Restrictions: Sit or stand as needed, Avoid frequent bending/lifting/twisting, No lifting/pushing/pulling more than 10 lbs, No above the shoulder/overhead work     Return Appointment: 07/22/2022 @ 10:00 AM    THERESE

## 2022-07-14 NOTE — PATIENT INSTRUCTIONS
Please drink plenty of fluids.  Please get plenty of rest.  Please return here or go to the Emergency Department for any concerns or worsening of condition.  If you were prescribed a narcotic medication, do not drive or operate heavy equipment or machinery while taking these medications.  If you were not prescribed an anti-inflammatory medication, and if you do not have any history of stomach/intestinal ulcers, or kidney disease, or are not taking a blood thinner such as Coumadin, Plavix, Pradaxa, Eloquis, or Xaralta for example, it is OK to take over the counter Ibuprofen or Advil or Motrin or Aleve as directed.  Do not take these medications on an empty stomach.    Warm compresses and/or heating pad to ribs several times a day for comfort.    You may bind your ribs with a large ace wrap or binder if you find it helpful to relieve pain.  Th is is optional.    If you  smoke, please stop smoking.       Please follow up with your primary care doctor or specialist as needed.      Blaine Lira MD  [phone]    You must understand that you have received treatment at an Urgent Care facility only, and that you may be  released before all of your medical problems are known or treated. Urgent Care facilities are not equipped to  handle life threatening emergencies. It is recommended that you seek care at an Emergency Department for  further evaluation of worsening or concerning symptoms, or possibly life threatening conditions as  discussed.    Rib Contusion or Minor Fracture    A rib contusion is a bruise to one or more rib bones. It may cause pain, tenderness, swelling, and a purplish tint to the skin. There may be a sharp pain with each breath. A rib contusion takes anywhere from a few days to a few weeks to heal. A minor rib fracture or break may cause the same symptoms as a rib contusion. The small crack may not be seen on a regular chest X-ray. Treatment for both problems is the same.  Home care  You may use  over-the-counter pain medicine to control pain, unless another pain medicine was prescribed. If you have chronic liver or kidney disease or ever had a stomach ulcer or GI bleeding, talk with your healthcare provider before using these medicines.  Rest. Do not lift anything heavy or do any activity that causes pain.  Apply an ice pack over the injured area for 15 to 20 minutes every 1 to 2 hours. You should do this for the first 24 to 48 hours. You can make an ice pack by filling a plastic bag that seals at the top with ice cubes and then wrapping it with a thin towel. Continue with ice packs as needed for the relief of pain and swelling.  The first 3 to 4 weeks of healing will be the most painful. If your pain is not under control with the treatment given, call your healthcare provider. Sometimes a stronger pain medicine may be needed. A nerve block can be done in case of severe pain. It will numb the nerve between the ribs.  Follow-up care  Follow up with your healthcare provider, or as advised.  If X-rays were taken, you will be told of any new findings that may affect your care.  Call 911  Call 911 if you have:  Dizziness, weakness or fainting  Shortness of breath with or without chest discomfort  New or worsening pain  When to seek medical advice  Call your healthcare provider right away if any of these occur:  Fever of 100.4°F (38°C) or above lasting for 24 to 48 hours  Stomach pain  Date Last Reviewed: 12/2/2015  © 2303-2450 [employer]. [address] [place] [address], [place] [address]. All rights reserved. This information is not intended as a substitute for professional medical care. Always follow your healthcare professional's instructions.

## 2022-07-20 ENCOUNTER — TELEPHONE (OUTPATIENT)
Dept: URGENT CARE | Facility: CLINIC | Age: 67
End: 2022-07-20
Payer: MEDICARE

## 2022-08-05 ENCOUNTER — OFFICE VISIT (OUTPATIENT)
Dept: URGENT CARE | Facility: CLINIC | Age: 67
End: 2022-08-05
Payer: OTHER MISCELLANEOUS

## 2022-08-05 VITALS
WEIGHT: 147 LBS | RESPIRATION RATE: 20 BRPM | BODY MASS INDEX: 23.63 KG/M2 | SYSTOLIC BLOOD PRESSURE: 128 MMHG | DIASTOLIC BLOOD PRESSURE: 68 MMHG | HEIGHT: 66 IN | HEART RATE: 60 BPM | TEMPERATURE: 97 F | OXYGEN SATURATION: 97 %

## 2022-08-05 DIAGNOSIS — S22.32XD CLOSED FRACTURE OF ONE RIB OF LEFT SIDE WITH ROUTINE HEALING, SUBSEQUENT ENCOUNTER: Primary | ICD-10-CM

## 2022-08-05 PROCEDURE — 99214 PR OFFICE/OUTPT VISIT, EST, LEVL IV, 30-39 MIN: ICD-10-PCS | Mod: S$GLB,,, | Performed by: FAMILY MEDICINE

## 2022-08-05 PROCEDURE — 99214 OFFICE O/P EST MOD 30 MIN: CPT | Mod: S$GLB,,, | Performed by: FAMILY MEDICINE

## 2022-08-05 RX ORDER — NAPROXEN 375 MG/1
375 TABLET ORAL 2 TIMES DAILY
Qty: 20 TABLET | Refills: 0 | Status: ON HOLD | OUTPATIENT
Start: 2022-08-05 | End: 2022-09-24

## 2022-08-05 NOTE — LETTER
[place] [facility]  [address] [facility] [address]  [place] [address]  Phone: [phone]  Fax: [phone]  [facility]: [id number]    Pt Name: Ritesh Leonard  Injury Date: 06/30/2022   Employee ID: [id number] Date of First Treatment: 08/05/2022   Company[employer]      Appointment Time: 01:45 PM Arrived: 1:45 PM   Provider: Cosme Davis MD Time Out: 3:15PM     Office Treatment:   1. Closed fracture of one rib of left side with routine healing, subsequent encounter      Medications Ordered This Encounter   Medications    naproxen (NAPROSYN) 375 MG tablet      Patient Instructions: Attention not to aggravate affected area, Apply ice 24-48 hours then apply heat/warm soaks    Restrictions: Avoid frequent bending/lifting/twisting, No lifting/pushing/pulling more than 10 lbs, No above the shoulder/overhead work, Limited use of left hand and arm     Return Appointment: 08/15/2022 @ 2:00 PM      TM
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
## 2022-08-05 NOTE — PATIENT INSTRUCTIONS
Please drink plenty of fluids.  Please get plenty of rest.  Please return here or go to the Emergency Department for any concerns or worsening of condition.  If you were prescribed a narcotic medication, do not drive or operate heavy equipment or machinery while taking these medications.  If you were not prescribed an anti-inflammatory medication, and if you do not have any history of stomach/intestinal ulcers, or kidney disease, or are not taking a blood thinner such as Coumadin, Plavix, Pradaxa, Eloquis, or Xaralta for example, it is OK to take over the counter Ibuprofen or Advil or Motrin or Aleve as directed.  Do not take these medications on an empty stomach.    Warm compresses and/or heating pad to ribs several times a day for comfort.    You may bind your ribs with a large ace wrap or binder if you find it helpful to relieve pain.  Th is is optional.    If you  smoke, please stop smoking.       Please follow up with your primary care doctor or specialist as needed.      Blaine Lira MD  [phone]    You must understand that you have received treatment at an Urgent Care facility only, and that you may be  released before all of your medical problems are known or treated. Urgent Care facilities are not equipped to  handle life threatening emergencies. It is recommended that you seek care at an Emergency Department for  further evaluation of worsening or concerning symptoms, or possibly life threatening conditions as  discussed.    Rib Contusion or Minor Fracture    A rib contusion is a bruise to one or more rib bones. It may cause pain, tenderness, swelling, and a purplish tint to the skin. There may be a sharp pain with each breath. A rib contusion takes anywhere from a few days to a few weeks to heal. A minor rib fracture or break may cause the same symptoms as a rib contusion. The small crack may not be seen on a regular chest X-ray. Treatment for both problems is the same.  Home care  You may use  over-the-counter pain medicine to control pain, unless another pain medicine was prescribed. If you have chronic liver or kidney disease or ever had a stomach ulcer or GI bleeding, talk with your healthcare provider before using these medicines.  Rest. Do not lift anything heavy or do any activity that causes pain.  Apply an ice pack over the injured area for 15 to 20 minutes every 1 to 2 hours. You should do this for the first 24 to 48 hours. You can make an ice pack by filling a plastic bag that seals at the top with ice cubes and then wrapping it with a thin towel. Continue with ice packs as needed for the relief of pain and swelling.  The first 3 to 4 weeks of healing will be the most painful. If your pain is not under control with the treatment given, call your healthcare provider. Sometimes a stronger pain medicine may be needed. A nerve block can be done in case of severe pain. It will numb the nerve between the ribs.  Follow-up care  Follow up with your healthcare provider, or as advised.  If X-rays were taken, you will be told of any new findings that may affect your care.  Call 911  Call 911 if you have:  Dizziness, weakness or fainting  Shortness of breath with or without chest discomfort  New or worsening pain  When to seek medical advice  Call your healthcare provider right away if any of these occur:  Fever of 100.4°F (38°C) or above lasting for 24 to 48 hours  Stomach pain  Date Last Reviewed: 12/2/2015  © 8717-0585 [employer]. [address] [place] [address], [place] [address]. All rights reserved. This information is not intended as a substitute for professional medical care. Always follow your healthcare professional's instructions.

## 2022-08-19 ENCOUNTER — OFFICE VISIT (OUTPATIENT)
Dept: URGENT CARE | Facility: CLINIC | Age: 67
End: 2022-08-19
Payer: OTHER MISCELLANEOUS

## 2022-08-19 VITALS
BODY MASS INDEX: 23.63 KG/M2 | WEIGHT: 147 LBS | HEIGHT: 66 IN | OXYGEN SATURATION: 97 % | DIASTOLIC BLOOD PRESSURE: 76 MMHG | HEART RATE: 58 BPM | TEMPERATURE: 98 F | SYSTOLIC BLOOD PRESSURE: 155 MMHG | RESPIRATION RATE: 18 BRPM

## 2022-08-19 DIAGNOSIS — S22.32XD CLOSED FRACTURE OF ONE RIB OF LEFT SIDE WITH ROUTINE HEALING, SUBSEQUENT ENCOUNTER: Primary | ICD-10-CM

## 2022-08-19 DIAGNOSIS — Z02.6 ENCOUNTER RELATED TO WORKER'S COMPENSATION CLAIM: ICD-10-CM

## 2022-08-19 PROCEDURE — 99214 OFFICE O/P EST MOD 30 MIN: CPT | Mod: S$GLB,,, | Performed by: FAMILY MEDICINE

## 2022-08-19 PROCEDURE — 71100 XR RIBS 2 VIEW LEFT: ICD-10-PCS | Mod: LT,S$GLB,, | Performed by: RADIOLOGY

## 2022-08-19 PROCEDURE — 99214 PR OFFICE/OUTPT VISIT, EST, LEVL IV, 30-39 MIN: ICD-10-PCS | Mod: S$GLB,,, | Performed by: FAMILY MEDICINE

## 2022-08-19 PROCEDURE — 71100 X-RAY EXAM RIBS UNI 2 VIEWS: CPT | Mod: LT,S$GLB,, | Performed by: RADIOLOGY

## 2022-08-19 RX ORDER — NAPROXEN 375 MG/1
375 TABLET ORAL 2 TIMES DAILY
Qty: 20 TABLET | Refills: 0 | Status: ON HOLD | OUTPATIENT
Start: 2022-08-19 | End: 2022-09-24

## 2022-08-19 NOTE — PROGRESS NOTES
Subjective:       Patient ID: Ritesh Leonard is a 66 y.o. male.    Chief Complaint: PT presents today for follow-up from 08/05/2022. Close fracture of left rib.   Follow-up  This is a recurrent problem. The current episode started 1 to 4 weeks ago. The problem occurs constantly. The problem has been gradually improving. The symptoms are aggravated by standing. Treatments tried: naproxen. The treatment provided no relief.       Constitution: Negative.   HENT: Negative.    Cardiovascular: Negative.    Eyes: Negative.    Respiratory: Negative.    Gastrointestinal: Negative.    Endocrine: negative.   Genitourinary: Negative.    Musculoskeletal: Negative.  Positive for pain and pain with walking.   Skin: Negative.    Allergic/Immunologic: Negative.    Neurological: Negative.    Hematologic/Lymphatic: Negative.    Psychiatric/Behavioral: Negative.         Objective:      Physical Exam  Vitals and nursing note reviewed.   Constitutional:       General: He is not in acute distress.     Appearance: Normal appearance. He is well-developed. He is not ill-appearing, toxic-appearing or diaphoretic.   HENT:      Head: Normocephalic and atraumatic. No abrasion, contusion or laceration.      Jaw: No trismus.      Right Ear: Hearing, tympanic membrane, ear canal and external ear normal. No hemotympanum.      Left Ear: Hearing, tympanic membrane, ear canal and external ear normal. No hemotympanum.      Nose: Nose normal. No nasal deformity, mucosal edema or rhinorrhea.      Right Sinus: No maxillary sinus tenderness or frontal sinus tenderness.      Left Sinus: No maxillary sinus tenderness or frontal sinus tenderness.      Mouth/Throat:      Dentition: Normal dentition.      Pharynx: Uvula midline. No posterior oropharyngeal erythema or uvula swelling.   Eyes:      General: Lids are normal. No scleral icterus.        Right eye: No discharge.         Left eye: No discharge.      Conjunctiva/sclera: Conjunctivae normal.      Pupils:  Pupils are equal, round, and reactive to light.      Comments: Sclera clear bilat   Neck:      Trachea: Trachea and phonation normal. No tracheal deviation.   Cardiovascular:      Rate and Rhythm: Normal rate and regular rhythm.      Pulses: Normal pulses.      Heart sounds: Normal heart sounds.   Pulmonary:      Effort: Pulmonary effort is normal. No respiratory distress.      Breath sounds: Normal breath sounds.   Chest:      Chest wall: Tenderness present.       Abdominal:      General: Bowel sounds are normal. There is no distension.      Palpations: Abdomen is soft. There is no mass or pulsatile mass.      Tenderness: There is no abdominal tenderness.   Musculoskeletal:         General: No deformity. Normal range of motion.      Cervical back: Full passive range of motion without pain, normal range of motion and neck supple. No rigidity. No spinous process tenderness or muscular tenderness.   Skin:     General: Skin is warm and dry.      Capillary Refill: Capillary refill takes less than 2 seconds.      Coloration: Skin is not pale.      Findings: No abrasion, bruising, burn, ecchymosis or laceration.   Neurological:      Mental Status: He is alert and oriented to person, place, and time.      GCS: GCS eye subscore is 4. GCS verbal subscore is 5. GCS motor subscore is 6.      Cranial Nerves: No cranial nerve deficit.      Sensory: No sensory deficit.      Motor: No abnormal muscle tone or seizure activity.      Coordination: Coordination normal.   Psychiatric:         Speech: Speech normal.         Behavior: Behavior normal. Behavior is cooperative.         Thought Content: Thought content normal.         Judgment: Judgment normal.       Type of Interpretation: ED Physician (Independently Interpreted).  Radiology Procedure Done: Left Rib X-Rays.  Interpretation: Healing 7th rib fracture.        Assessment:       1. Closed fracture of one rib of left side with routine healing, subsequent encounter    2.  Encounter related to worker's compensation claim        Plan:         Medications Ordered This Encounter   Medications    naproxen (NAPROSYN) 375 MG tablet     Sig: Take 1 tablet (375 mg total) by mouth 2 (two) times daily.     Dispense:  20 tablet     Refill:  0     Patient Instructions: Attention not to aggravate affected area, Apply ice 24-48 hours then apply heat/warm soaks   Restrictions: Avoid frequent bending/lifting/twisting, No lifting/pushing/pulling more than 10 lbs, No above the shoulder/overhead work, No Prolonged standing/walking, Limited use of left hand and arm (Light duty for 1 week.)  Follow up in about 3 days (around 8/22/2022), or if symptoms worsen or fail to improve.      Please drink plenty of fluids.  Please get plenty of rest.  Please return here or go to the Emergency Department for any concerns or worsening of condition.  If you were prescribed a narcotic medication, do not drive or operate heavy equipment or machinery while taking these medications.  If you were not prescribed an anti-inflammatory medication, and if you do not have any history of stomach/intestinal ulcers, or kidney disease, or are not taking a blood thinner such as Coumadin, Plavix, Pradaxa, Eloquis, or Xaralta for example, it is OK to take over the counter Ibuprofen or Advil or Motrin or Aleve as directed.  Do not take these medications on an empty stomach.    Warm compresses and/or heating pad to ribs several times a day for comfort.    You may bind your ribs with a large ace wrap or binder if you find it helpful to relieve pain.  Th is is optional.    If you  smoke, please stop smoking.       Please follow up with your primary care doctor or specialist as needed.      Blaine Lira MD  [phone]    You must understand that you have received treatment at an Urgent Care facility only, and that you may be  released before all of your medical problems are known or treated. Urgent Care facilities are not equipped  to  handle life threatening emergencies. It is recommended that you seek care at an Emergency Department for  further evaluation of worsening or concerning symptoms, or possibly life threatening conditions as  discussed.
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
## 2022-08-19 NOTE — PATIENT INSTRUCTIONS
Please drink plenty of fluids.  Please get plenty of rest.  Please return here or go to the Emergency Department for any concerns or worsening of condition.  If you were prescribed a narcotic medication, do not drive or operate heavy equipment or machinery while taking these medications.  If you were not prescribed an anti-inflammatory medication, and if you do not have any history of stomach/intestinal ulcers, or kidney disease, or are not taking a blood thinner such as Coumadin, Plavix, Pradaxa, Eloquis, or Xaralta for example, it is OK to take over the counter Ibuprofen or Advil or Motrin or Aleve as directed.  Do not take these medications on an empty stomach.    Warm compresses and/or heating pad to ribs several times a day for comfort.    You may bind your ribs with a large ace wrap or binder if you find it helpful to relieve pain.  Th is is optional.    If you  smoke, please stop smoking.       Please follow up with your primary care doctor or specialist as needed.      Blaine Lira MD  [phone]    You must understand that you have received treatment at an Urgent Care facility only, and that you may be  released before all of your medical problems are known or treated. Urgent Care facilities are not equipped to  handle life threatening emergencies. It is recommended that you seek care at an Emergency Department for  further evaluation of worsening or concerning symptoms, or possibly life threatening conditions as  discussed.    Rib Contusion or Minor Fracture    A rib contusion is a bruise to one or more rib bones. It may cause pain, tenderness, swelling, and a purplish tint to the skin. There may be a sharp pain with each breath. A rib contusion takes anywhere from a few days to a few weeks to heal. A minor rib fracture or break may cause the same symptoms as a rib contusion. The small crack may not be seen on a regular chest X-ray. Treatment for both problems is the same.  Home care  You may use  over-the-counter pain medicine to control pain, unless another pain medicine was prescribed. If you have chronic liver or kidney disease or ever had a stomach ulcer or GI bleeding, talk with your healthcare provider before using these medicines.  Rest. Do not lift anything heavy or do any activity that causes pain.  Apply an ice pack over the injured area for 15 to 20 minutes every 1 to 2 hours. You should do this for the first 24 to 48 hours. You can make an ice pack by filling a plastic bag that seals at the top with ice cubes and then wrapping it with a thin towel. Continue with ice packs as needed for the relief of pain and swelling.  The first 3 to 4 weeks of healing will be the most painful. If your pain is not under control with the treatment given, call your healthcare provider. Sometimes a stronger pain medicine may be needed. A nerve block can be done in case of severe pain. It will numb the nerve between the ribs.  Follow-up care  Follow up with your healthcare provider, or as advised.  If X-rays were taken, you will be told of any new findings that may affect your care.  Call 911  Call 911 if you have:  Dizziness, weakness or fainting  Shortness of breath with or without chest discomfort  New or worsening pain  When to seek medical advice  Call your healthcare provider right away if any of these occur:  Fever of 100.4°F (38°C) or above lasting for 24 to 48 hours  Stomach pain  Date Last Reviewed: 12/2/2015  © 0766-6382 [employer]. [address] [place] [address], [place] [address]. All rights reserved. This information is not intended as a substitute for professional medical care. Always follow your healthcare professional's instructions.

## 2022-08-25 ENCOUNTER — OFFICE VISIT (OUTPATIENT)
Dept: URGENT CARE | Facility: CLINIC | Age: 67
End: 2022-08-25
Payer: OTHER MISCELLANEOUS

## 2022-08-25 VITALS
SYSTOLIC BLOOD PRESSURE: 116 MMHG | TEMPERATURE: 98 F | HEART RATE: 69 BPM | OXYGEN SATURATION: 95 % | BODY MASS INDEX: 23.63 KG/M2 | WEIGHT: 147 LBS | RESPIRATION RATE: 18 BRPM | HEIGHT: 66 IN | DIASTOLIC BLOOD PRESSURE: 63 MMHG

## 2022-08-25 DIAGNOSIS — Z02.6 ENCOUNTER RELATED TO WORKER'S COMPENSATION CLAIM: Primary | ICD-10-CM

## 2022-08-25 DIAGNOSIS — S22.32XD CLOSED FRACTURE OF ONE RIB OF LEFT SIDE WITH ROUTINE HEALING, SUBSEQUENT ENCOUNTER: ICD-10-CM

## 2022-08-25 PROCEDURE — 99214 OFFICE O/P EST MOD 30 MIN: CPT | Mod: S$GLB,,, | Performed by: FAMILY MEDICINE

## 2022-08-25 PROCEDURE — 99214 PR OFFICE/OUTPT VISIT, EST, LEVL IV, 30-39 MIN: ICD-10-PCS | Mod: S$GLB,,, | Performed by: FAMILY MEDICINE

## 2022-08-25 NOTE — PATIENT INSTRUCTIONS
Please drink plenty of fluids.  Please get plenty of rest.  Please return here or go to the Emergency Department for any concerns or worsening of condition.  If you were prescribed a narcotic medication, do not drive or operate heavy equipment or machinery while taking these medications.  If you were not prescribed an anti-inflammatory medication, and if you do not have any history of stomach/intestinal ulcers, or kidney disease, or are not taking a blood thinner such as Coumadin, Plavix, Pradaxa, Eloquis, or Xaralta for example, it is OK to take over the counter Ibuprofen or Advil or Motrin or Aleve as directed.  Do not take these medications on an empty stomach.    Warm compresses and/or heating pad to ribs several times a day for comfort.    You may bind your ribs with a large ace wrap or binder if you find it helpful to relieve pain.  Th is is optional.    If you  smoke, please stop smoking.       Please follow up with your primary care doctor or specialist as needed.      Blaine Lira MD  [phone]    You must understand that you have received treatment at an Urgent Care facility only, and that you may be  released before all of your medical problems are known or treated. Urgent Care facilities are not equipped to  handle life threatening emergencies. It is recommended that you seek care at an Emergency Department for  further evaluation of worsening or concerning symptoms, or possibly life threatening conditions as  discussed.    Rib Contusion or Minor Fracture    A rib contusion is a bruise to one or more rib bones. It may cause pain, tenderness, swelling, and a purplish tint to the skin. There may be a sharp pain with each breath. A rib contusion takes anywhere from a few days to a few weeks to heal. A minor rib fracture or break may cause the same symptoms as a rib contusion. The small crack may not be seen on a regular chest X-ray. Treatment for both problems is the same.  Home care  You may use  over-the-counter pain medicine to control pain, unless another pain medicine was prescribed. If you have chronic liver or kidney disease or ever had a stomach ulcer or GI bleeding, talk with your healthcare provider before using these medicines.  Rest. Do not lift anything heavy or do any activity that causes pain.  Apply an ice pack over the injured area for 15 to 20 minutes every 1 to 2 hours. You should do this for the first 24 to 48 hours. You can make an ice pack by filling a plastic bag that seals at the top with ice cubes and then wrapping it with a thin towel. Continue with ice packs as needed for the relief of pain and swelling.  The first 3 to 4 weeks of healing will be the most painful. If your pain is not under control with the treatment given, call your healthcare provider. Sometimes a stronger pain medicine may be needed. A nerve block can be done in case of severe pain. It will numb the nerve between the ribs.  Follow-up care  Follow up with your healthcare provider, or as advised.  If X-rays were taken, you will be told of any new findings that may affect your care.  Call 911  Call 911 if you have:  Dizziness, weakness or fainting  Shortness of breath with or without chest discomfort  New or worsening pain  When to seek medical advice  Call your healthcare provider right away if any of these occur:  Fever of 100.4°F (38°C) or above lasting for 24 to 48 hours  Stomach pain  Date Last Reviewed: 12/2/2015  © 2117-0873 [employer]. [address] [place] [address], [place] [address]. All rights reserved. This information is not intended as a substitute for professional medical care. Always follow your healthcare professional's instructions.

## 2022-08-25 NOTE — LETTER
[facility]  [address]  [place] [address]  Phone: [phone]  Fax: [phone]  [facility] [facility]: [phone]    Pt Name: Ritesh Leonard  Injury Date: 06/30/2022   Employee ID: [id number] Date of Treatment: 08/25/2022   Company: [employer]      Appointment Time: 01:45 PM Arrived: 1:30 pm   Provider: Cosme Davis MD Time Out:2:40 pm     Office Treatment:   1. Encounter related to worker's compensation claim    2. Closed fracture of one rib of left side with routine healing, subsequent encounter          Patient Instructions: Attention not to aggravate affected area    Restrictions: Regular Duty, Discharged from Occupational Health     Return Appointment: PRN

## 2022-08-25 NOTE — PROGRESS NOTES
Subjective:       Patient ID: Ritesh Leonard is a 66 y.o. male.    Chief Complaint: Rib Injury    Patient had rib fracture from workers comp injury from 05/03.    Other  This is a recurrent problem. The current episode started more than 1 month ago (05/03). The problem has been gradually improving. Exacerbated by: laying down-pain is 3 or 4.       Constitution: Negative.   HENT: Negative.    Cardiovascular: Negative.    Eyes: Negative.    Respiratory: Negative.    Gastrointestinal: Negative.    Endocrine: negative.   Genitourinary: Negative.    Musculoskeletal: Negative.    Skin: Negative.    Allergic/Immunologic: Negative.    Neurological: Negative.    Hematologic/Lymphatic: Negative.    Psychiatric/Behavioral: Negative.         Objective:      Physical Exam  Vitals and nursing note reviewed.   Constitutional:       General: He is not in acute distress.     Appearance: Normal appearance. He is well-developed. He is not ill-appearing, toxic-appearing or diaphoretic.   HENT:      Head: Normocephalic and atraumatic.      Jaw: No trismus.      Right Ear: Hearing, tympanic membrane, ear canal and external ear normal.      Left Ear: Hearing, tympanic membrane, ear canal and external ear normal.      Nose: Nose normal. No nasal deformity, mucosal edema or rhinorrhea.      Right Sinus: No maxillary sinus tenderness or frontal sinus tenderness.      Left Sinus: No maxillary sinus tenderness or frontal sinus tenderness.      Mouth/Throat:      Dentition: Normal dentition.      Pharynx: Uvula midline. No posterior oropharyngeal erythema or uvula swelling.   Eyes:      General: Lids are normal. No scleral icterus.        Right eye: No discharge.         Left eye: No discharge.      Conjunctiva/sclera: Conjunctivae normal.      Comments: Sclera clear bilat   Neck:      Trachea: Trachea and phonation normal.   Cardiovascular:      Rate and Rhythm: Normal rate and regular rhythm.      Pulses: Normal pulses.      Heart sounds:  Normal heart sounds.   Pulmonary:      Effort: Pulmonary effort is normal. No respiratory distress.      Breath sounds: Normal breath sounds.   Abdominal:      General: Bowel sounds are normal. There is no distension.      Palpations: Abdomen is soft. There is no mass or pulsatile mass.      Tenderness: There is no abdominal tenderness.   Musculoskeletal:         General: No deformity. Normal range of motion.      Cervical back: Full passive range of motion without pain, normal range of motion and neck supple.   Skin:     General: Skin is warm and dry.      Coloration: Skin is not pale.   Neurological:      Mental Status: He is alert and oriented to person, place, and time.      Motor: No abnormal muscle tone.      Coordination: Coordination normal.   Psychiatric:         Speech: Speech normal.         Behavior: Behavior normal. Behavior is cooperative.         Thought Content: Thought content normal.         Judgment: Judgment normal.         Assessment:       1. Encounter related to worker's compensation claim    2. Closed fracture of one rib of left side with routine healing, subsequent encounter        Plan:            Patient Instructions: Attention not to aggravate affected area   Restrictions: Regular Duty, Discharged from Occupational Health  Follow up if symptoms worsen or fail to improve.      Please drink plenty of fluids.  Please get plenty of rest.  Please return here or go to the Emergency Department for any concerns or worsening of condition.  If you were prescribed a narcotic medication, do not drive or operate heavy equipment or machinery while taking these medications.  If you were not prescribed an anti-inflammatory medication, and if you do not have any history of stomach/intestinal ulcers, or kidney disease, or are not taking a blood thinner such as Coumadin, Plavix, Pradaxa, Eloquis, or Xaralta for example, it is OK to take over the counter Ibuprofen or Advil or Motrin or Aleve as directed.  Do  not take these medications on an empty stomach.    Warm compresses and/or heating pad to ribs several times a day for comfort.    You may bind your ribs with a large ace wrap or binder if you find it helpful to relieve pain.  Th is is optional.    If you  smoke, please stop smoking.       Please follow up with your primary care doctor or specialist as needed.      Blaine Lira MD  [phone]    You must understand that you have received treatment at an Urgent Care facility only, and that you may be  released before all of your medical problems are known or treated. Urgent Care facilities are not equipped to  handle life threatening emergencies. It is recommended that you seek care at an Emergency Department for  further evaluation of worsening or concerning symptoms, or possibly life threatening conditions as  discussed.

## 2022-09-24 PROBLEM — D62 ACUTE BLOOD LOSS ANEMIA: Status: ACTIVE | Noted: 2022-09-24

## 2022-09-24 PROBLEM — N17.9 AKI (ACUTE KIDNEY INJURY): Status: ACTIVE | Noted: 2022-09-24

## 2022-09-24 PROBLEM — E83.52 HYPERCALCEMIA: Status: ACTIVE | Noted: 2022-09-24

## 2022-09-24 PROBLEM — S32.000A COMPRESSION OF LUMBAR VERTEBRA: Status: ACTIVE | Noted: 2022-09-24

## 2022-09-26 PROBLEM — E63.9 INADEQUATE DIETARY ENERGY INTAKE: Status: ACTIVE | Noted: 2022-09-26

## 2022-09-26 PROBLEM — D64.9 ANEMIA: Status: ACTIVE | Noted: 2022-09-24

## 2022-09-29 PROBLEM — C90.00 MULTIPLE MYELOMA: Status: ACTIVE | Noted: 2022-09-29

## 2022-09-29 PROBLEM — C90.00 MULTIPLE MYELOMA NOT HAVING ACHIEVED REMISSION: Status: ACTIVE | Noted: 2022-09-29

## 2022-10-04 PROBLEM — Z91.89 AT RISK FOR COPING DIFFICULTY: Status: ACTIVE | Noted: 2022-10-04

## 2022-10-06 PROBLEM — E63.9 INADEQUATE DIETARY ENERGY INTAKE: Status: RESOLVED | Noted: 2022-09-26 | Resolved: 2022-10-06

## 2022-10-10 PROBLEM — Z53.1 BLOOD TRANSFUSION DECLINED BECAUSE PATIENT IS JEHOVAH'S WITNESS: Status: ACTIVE | Noted: 2022-10-10

## 2022-10-13 PROBLEM — R97.20 ELEVATED PSA: Status: ACTIVE | Noted: 2022-10-13

## 2022-10-13 PROBLEM — D80.1 HYPOGAMMAGLOBULINEMIA: Status: ACTIVE | Noted: 2022-10-13

## 2022-10-13 PROBLEM — Z71.2 ENCOUNTER TO DISCUSS TEST RESULTS: Status: ACTIVE | Noted: 2022-10-13

## 2022-10-13 PROBLEM — C90.02 MULTIPLE MYELOMA IN RELAPSE: Status: ACTIVE | Noted: 2022-09-29

## 2022-10-13 PROBLEM — Z71.89 ENCOUNTER TO DISCUSS TREATMENT OPTIONS: Status: ACTIVE | Noted: 2022-10-13

## 2022-11-09 PROBLEM — Z51.11 ENCOUNTER FOR ANTINEOPLASTIC CHEMOTHERAPY: Status: ACTIVE | Noted: 2022-11-09

## 2022-11-30 PROBLEM — Z91.89 AT HIGH RISK FOR VENOUS THROMBOEMBOLISM (VTE): Status: ACTIVE | Noted: 2022-11-30

## 2022-12-26 PROBLEM — N17.9 AKI (ACUTE KIDNEY INJURY): Status: RESOLVED | Noted: 2022-09-24 | Resolved: 2022-12-26

## 2023-01-11 PROBLEM — E87.6 HYPOKALEMIA: Status: ACTIVE | Noted: 2023-01-11

## 2023-02-08 PROBLEM — L27.0 DRUG ERUPTION: Status: ACTIVE | Noted: 2023-02-08

## 2023-02-08 PROBLEM — D69.6 THROMBOCYTOPENIA: Status: ACTIVE | Noted: 2023-02-08

## 2023-02-08 PROBLEM — R21 RASH: Status: ACTIVE | Noted: 2023-02-08

## 2023-02-20 PROBLEM — N63.0 MASS OF BREAST: Status: ACTIVE | Noted: 2023-02-20

## 2023-02-20 PROBLEM — R60.9 SWELLING: Status: ACTIVE | Noted: 2023-02-20

## 2023-03-15 PROBLEM — N62 GYNECOMASTIA: Status: ACTIVE | Noted: 2023-03-15

## 2023-03-15 PROBLEM — N63.0 MASS OF BREAST: Status: RESOLVED | Noted: 2023-02-20 | Resolved: 2023-03-15

## 2023-04-11 ENCOUNTER — PATIENT MESSAGE (OUTPATIENT)
Dept: RESEARCH | Facility: HOSPITAL | Age: 68
End: 2023-04-11
Payer: MEDICARE

## 2023-04-26 PROBLEM — Z13.79 ENCOUNTER FOR OTHER SCREENING FOR GENETIC AND CHROMOSOMAL ANOMALIES: Status: ACTIVE | Noted: 2023-04-26

## 2023-09-27 PROBLEM — Z71.9 ENCOUNTER FOR HEALTH EDUCATION: Status: ACTIVE | Noted: 2023-09-27

## 2024-05-18 PROBLEM — S32.402A LEFT ACETABULAR FRACTURE: Status: ACTIVE | Noted: 2024-05-18

## 2025-06-07 ENCOUNTER — OFFICE VISIT (OUTPATIENT)
Dept: URGENT CARE | Facility: CLINIC | Age: 70
End: 2025-06-07
Payer: MEDICARE

## 2025-06-07 VITALS
HEART RATE: 94 BPM | BODY MASS INDEX: 24.83 KG/M2 | TEMPERATURE: 100 F | OXYGEN SATURATION: 95 % | HEIGHT: 65 IN | DIASTOLIC BLOOD PRESSURE: 75 MMHG | SYSTOLIC BLOOD PRESSURE: 137 MMHG | RESPIRATION RATE: 18 BRPM | WEIGHT: 149 LBS

## 2025-06-07 DIAGNOSIS — J06.9 UPPER RESPIRATORY TRACT INFECTION, UNSPECIFIED TYPE: Primary | ICD-10-CM

## 2025-06-07 LAB
CTP QC/QA: YES
SARS-COV+SARS-COV-2 AG RESP QL IA.RAPID: NEGATIVE

## 2025-06-07 PROCEDURE — 99203 OFFICE O/P NEW LOW 30 MIN: CPT | Mod: S$GLB,,,

## 2025-06-07 PROCEDURE — 87811 SARS-COV-2 COVID19 W/OPTIC: CPT | Mod: QW,S$GLB,,

## 2025-06-07 RX ORDER — CETIRIZINE HYDROCHLORIDE 10 MG/1
10 TABLET ORAL DAILY
Qty: 30 TABLET | Refills: 0 | Status: SHIPPED | OUTPATIENT
Start: 2025-06-07 | End: 2025-07-07

## 2025-06-07 RX ORDER — VITAMIN A 3000 MCG
1 CAPSULE ORAL
Qty: 50 ML | Refills: 0 | Status: SHIPPED | OUTPATIENT
Start: 2025-06-07

## 2025-06-07 RX ORDER — AMOXICILLIN 875 MG/1
875 TABLET, COATED ORAL EVERY 12 HOURS
Qty: 14 TABLET | Refills: 0 | Status: SHIPPED | OUTPATIENT
Start: 2025-06-07 | End: 2025-06-14

## 2025-06-07 RX ORDER — FLUTICASONE PROPIONATE 50 MCG
1 SPRAY, SUSPENSION (ML) NASAL DAILY
Qty: 9.9 ML | Refills: 0 | Status: SHIPPED | OUTPATIENT
Start: 2025-06-07

## 2025-06-07 NOTE — PROGRESS NOTES
"Subjective:      Patient ID: Ritesh Leonard is a 69 y.o. male.    Vitals:  height is 5' 5" (1.651 m) and weight is 67.6 kg (149 lb). His oral temperature is 99.6 °F (37.6 °C). His blood pressure is 137/75 and his pulse is 94. His respiration is 18 and oxygen saturation is 95%.     Chief Complaint: Cough    68 yo M here with complaint of cough, post nasal drip and runny nose that started last night.  Patient has multiple myeloma and receives chemo once weekly. He denies fever but states he just doesn't feel right.    Cough  This is a new problem. The current episode started yesterday. The problem has been unchanged. The problem occurs every few minutes. The cough is Productive of sputum. Associated symptoms include postnasal drip and rhinorrhea. Pertinent negatives include no ear congestion, ear pain, fever, headaches, nasal congestion, sore throat, shortness of breath or wheezing. The symptoms are aggravated by lying down. He has tried nothing for the symptoms. The treatment provided no relief. There is no history of asthma, bronchitis or pneumonia.       Constitution: Negative for fever.   HENT:  Positive for postnasal drip. Negative for ear pain, congestion and sore throat.    Respiratory:  Positive for cough. Negative for shortness of breath and wheezing.    Gastrointestinal:  Negative for nausea, vomiting and diarrhea.   Neurological:  Negative for headaches.      Objective:     Physical Exam   Constitutional: He is oriented to person, place, and time.  Non-toxic appearance. He does not appear ill. No distress. normal  HENT:   Head: Normocephalic and atraumatic.   Ears:   Right Ear: Tympanic membrane, external ear and ear canal normal.   Left Ear: Tympanic membrane, external ear and ear canal normal.   Nose: Rhinorrhea present. No congestion.   Mouth/Throat: Posterior oropharyngeal erythema present. No oropharyngeal exudate.   Eyes: Conjunctivae are normal.   Cardiovascular: Normal rate, regular rhythm and " normal heart sounds.   Pulmonary/Chest: Effort normal and breath sounds normal.   Abdominal: Normal appearance.   Neurological: He is alert and oriented to person, place, and time.   Skin: Skin is warm, dry and not diaphoretic. Capillary refill takes less than 2 seconds.   Psychiatric: His behavior is normal.   Nursing note and vitals reviewed.      Assessment:     1. Upper respiratory tract infection, unspecified type      Results for orders placed or performed in visit on 06/07/25   SARS Coronavirus 2 Antigen, POCT Manual Read    Collection Time: 06/07/25  2:42 PM   Result Value Ref Range    SARS Coronavirus 2 Antigen Negative Negative, Presumptive Negative     Acceptable Yes      *Note: Due to a large number of results and/or encounters for the requested time period, some results have not been displayed. A complete set of results can be found in Results Review.       Plan:       Upper respiratory tract infection, unspecified type  -     SARS Coronavirus 2 Antigen, POCT Manual Read  -     sodium chloride (SALINE NASAL) 0.65 % nasal spray; 1 spray by Nasal route as needed for Congestion.  Dispense: 50 mL; Refill: 0  -     fluticasone propionate (FLONASE) 50 mcg/actuation nasal spray; 1 spray (50 mcg total) by Each Nostril route once daily.  Dispense: 9.9 mL; Refill: 0  -     cetirizine (ZYRTEC) 10 MG tablet; Take 1 tablet (10 mg total) by mouth once daily.  Dispense: 30 tablet; Refill: 0  -     amoxicillin (AMOXIL) 875 MG tablet; Take 1 tablet (875 mg total) by mouth every 12 (twelve) hours. for 7 days  Dispense: 14 tablet; Refill: 0      Patient Instructions   1.  Take all medications as directed.  See attached handout for more information regarding your condition and how to manage it.  2.  Rest and keep yourself/patient well hydrated.  3.  You can alternate Tylenol and Motrin every 4-6 hours for fever above 100.4F and/or pain.   4. You should schedule a follow-up appointment with your Primary Care  Provider for recheck in 2-3 days or as directed at this visit.   5.  If your condition fails to improve in a timely manner, you should receive another evaluation by your Primary Care Provider to discuss your concerns or return to urgent care for a recheck.  If your condition worsens at any time, you should report immediately to your nearest Emergency Department for further evaluation. **You must understand that you have received Urgent Care treatment only and that you may be released before all of your medical problems are known or treated. You, the patient, are responsible to arrange for follow-up care as instructed.